# Patient Record
Sex: FEMALE | URBAN - METROPOLITAN AREA
[De-identification: names, ages, dates, MRNs, and addresses within clinical notes are randomized per-mention and may not be internally consistent; named-entity substitution may affect disease eponyms.]

---

## 2019-01-22 ENCOUNTER — APPOINTMENT (OUTPATIENT)
Age: 49
Setting detail: DERMATOLOGY
End: 2019-01-29

## 2019-01-22 DIAGNOSIS — M25.50 PAIN IN UNSPECIFIED JOINT: ICD-10-CM

## 2019-01-22 DIAGNOSIS — L98.2 FEBRILE NEUTROPHILIC DERMATOSIS [SWEET]: ICD-10-CM

## 2019-01-22 PROCEDURE — 99242 OFF/OP CONSLTJ NEW/EST SF 20: CPT | Mod: 25

## 2019-01-22 PROCEDURE — OTHER INTRAMUSCULAR KENALOG: OTHER

## 2019-01-22 PROCEDURE — OTHER RECORDS REQUESTED: OTHER

## 2019-01-22 PROCEDURE — OTHER PRESCRIPTION: OTHER

## 2019-01-22 PROCEDURE — OTHER PRESCRIPTION MEDICATION MANAGEMENT: OTHER

## 2019-01-22 PROCEDURE — 96372 THER/PROPH/DIAG INJ SC/IM: CPT

## 2019-01-22 PROCEDURE — OTHER ORDER TESTS: OTHER

## 2019-01-22 ASSESSMENT — SEVERITY ASSESSMENT: SEVERITY: MODERATE TO SEVERE

## 2019-01-22 ASSESSMENT — LOCATION SIMPLE DESCRIPTION DERM
LOCATION SIMPLE: LEFT FOREHEAD
LOCATION SIMPLE: RIGHT FOREHEAD
LOCATION SIMPLE: GLABELLA
LOCATION SIMPLE: LEFT DELTOID

## 2019-01-22 ASSESSMENT — LOCATION DETAILED DESCRIPTION DERM
LOCATION DETAILED: GLABELLA
LOCATION DETAILED: LEFT SUPERIOR FOREHEAD
LOCATION DETAILED: RIGHT INFERIOR LATERAL FOREHEAD
LOCATION DETAILED: LEFT DELTOID

## 2019-01-22 ASSESSMENT — LOCATION ZONE DERM
LOCATION ZONE: SHOULDER
LOCATION ZONE: FACE

## 2019-01-22 NOTE — HPI: SKIN LESION
What Type Of Note Output Would You Prefer (Optional)?: Standard Output
How Severe Is Your Skin Lesion?: moderate
Has Your Skin Lesion Been Treated?: not been treated
Is This A New Presentation, Or A Follow-Up?: Skin Lesion
Additional History: Pt states that she started with flu like symptoms one week ago then a fever 101 on Sunday. This flare up started above her right eye with joint pain and tenderness. 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 She has not had any flare ups for 2 years until this one. Pt had biopsy to confirm at [place] 2014. No apparent precipitating factors in the past or now. Had complete evaluation for CA, infectious disease and no history of IBS. Pt states she was placed on high dose steroids and had several infusions during her 2 year flare [id number]. She was given Dapsone which caused severe GI issues.

## 2019-01-22 NOTE — PROCEDURE: RECORDS REQUESTED
Providers To Request Records From: Dr. Fang and [facility] Providers To Request Records From: Dr. Fang and [facility]

## 2019-01-22 NOTE — PROCEDURE: PRESCRIPTION MEDICATION MANAGEMENT
Render In Strict Bullet Format?: No
Detail Level: Simple
Initiate Treatment: Paper script: Norco 10mg #30 1/2-1 tablet q4-6h prn pain. Pt request has not been given anything yet and states she does not like taking pain medication but this is very painful.

## 2019-01-23 RX ORDER — PREDNISONE 10 MG/1
TABLET ORAL QAM
Qty: 98 | Refills: 0 | Status: ERX | COMMUNITY
Start: 2019-01-23

## 2019-01-29 ENCOUNTER — APPOINTMENT (OUTPATIENT)
Age: 49
Setting detail: DERMATOLOGY
End: 2019-01-30

## 2019-01-29 DIAGNOSIS — L01.01 NON-BULLOUS IMPETIGO: ICD-10-CM

## 2019-01-29 DIAGNOSIS — L98.2 FEBRILE NEUTROPHILIC DERMATOSIS [SWEET]: ICD-10-CM

## 2019-01-29 PROCEDURE — OTHER PRESCRIPTION MEDICATION MANAGEMENT: OTHER

## 2019-01-29 PROCEDURE — 99212 OFFICE O/P EST SF 10 MIN: CPT

## 2019-01-29 PROCEDURE — OTHER RECOMMENDATIONS: OTHER

## 2019-01-29 PROCEDURE — 99214 OFFICE O/P EST MOD 30 MIN: CPT

## 2019-01-29 PROCEDURE — OTHER PRESCRIPTION: OTHER

## 2019-01-29 PROCEDURE — OTHER ORDER TESTS: OTHER

## 2019-01-29 RX ORDER — IBUPROFEN 800 MG/1
TABLET, FILM COATED ORAL
Qty: 50 | Refills: 0 | Status: ERX | COMMUNITY
Start: 2019-01-29

## 2019-01-29 RX ORDER — DOXYCYCLINE HYCLATE 100 MG/1
CAPSULE, GELATIN COATED ORAL
Qty: 60 | Refills: 0 | Status: ERX | COMMUNITY
Start: 2019-01-29

## 2019-01-29 ASSESSMENT — LOCATION ZONE DERM
LOCATION ZONE: FACE

## 2019-01-29 ASSESSMENT — LOCATION DETAILED DESCRIPTION DERM
LOCATION DETAILED: LEFT MID TEMPLE
LOCATION DETAILED: LEFT LATERAL FOREHEAD
LOCATION DETAILED: LEFT LATERAL FOREHEAD
LOCATION DETAILED: LEFT MID TEMPLE
LOCATION DETAILED: LEFT SUPERIOR MEDIAL FOREHEAD
LOCATION DETAILED: RIGHT SUPERIOR FOREHEAD
LOCATION DETAILED: RIGHT SUPERIOR FOREHEAD
LOCATION DETAILED: LEFT SUPERIOR MEDIAL FOREHEAD
LOCATION DETAILED: RIGHT SUPERIOR FOREHEAD

## 2019-01-29 ASSESSMENT — LOCATION SIMPLE DESCRIPTION DERM
LOCATION SIMPLE: RIGHT FOREHEAD
LOCATION SIMPLE: LEFT FOREHEAD
LOCATION SIMPLE: RIGHT FOREHEAD
LOCATION SIMPLE: LEFT TEMPLE
LOCATION SIMPLE: LEFT TEMPLE
LOCATION SIMPLE: RIGHT FOREHEAD
LOCATION SIMPLE: LEFT FOREHEAD

## 2019-01-29 NOTE — PROCEDURE: RECOMMENDATIONS
Recommendations (Free Text): Pt see Dr. Chahal today for further evaluation and management as her condition is worsening.
Recommendation Preamble: The following recommendations were made during the visit:
Detail Level: Zone

## 2019-01-29 NOTE — PROCEDURE: PRESCRIPTION MEDICATION MANAGEMENT
Render In Strict Bullet Format?: No
Detail Level: Zone
Continue Regimen: Prednisone
Initiate Treatment: Doxy hyclate 100 mg po bid
Modify Regimen: Decrease prednisone to 40 mg po qam. Take 2 hours apart from Ibuprofen

## 2019-02-26 ENCOUNTER — RX ONLY (RX ONLY)
Age: 49
End: 2019-02-26

## 2019-02-26 RX ORDER — IBUPROFEN 800 MG/1
TABLET, FILM COATED ORAL
Qty: 50 | Refills: 0 | Status: ERX

## 2019-02-26 RX ORDER — DOXYCYCLINE HYCLATE 100 MG/1
CAPSULE, GELATIN COATED ORAL
Qty: 60 | Refills: 0 | Status: CANCELLED
Stop reason: SDUPTHER